# Patient Record
Sex: FEMALE | Race: WHITE
[De-identification: names, ages, dates, MRNs, and addresses within clinical notes are randomized per-mention and may not be internally consistent; named-entity substitution may affect disease eponyms.]

---

## 2017-09-15 ENCOUNTER — HOSPITAL ENCOUNTER (EMERGENCY)
Dept: HOSPITAL 62 - ER | Age: 52
LOS: 1 days | Discharge: HOME | End: 2017-09-16
Payer: MEDICAID

## 2017-09-15 DIAGNOSIS — G89.29: ICD-10-CM

## 2017-09-15 DIAGNOSIS — M54.2: ICD-10-CM

## 2017-09-15 DIAGNOSIS — R51: ICD-10-CM

## 2017-09-15 DIAGNOSIS — E11.9: ICD-10-CM

## 2017-09-15 DIAGNOSIS — J01.90: Primary | ICD-10-CM

## 2017-09-15 DIAGNOSIS — F17.200: ICD-10-CM

## 2017-09-15 DIAGNOSIS — Z79.899: ICD-10-CM

## 2017-09-15 PROCEDURE — 80164 ASSAY DIPROPYLACETIC ACD TOT: CPT

## 2017-09-15 PROCEDURE — 99284 EMERGENCY DEPT VISIT MOD MDM: CPT

## 2017-09-15 PROCEDURE — 36415 COLL VENOUS BLD VENIPUNCTURE: CPT

## 2017-09-15 PROCEDURE — 70450 CT HEAD/BRAIN W/O DYE: CPT

## 2017-09-15 PROCEDURE — 80048 BASIC METABOLIC PNL TOTAL CA: CPT

## 2017-09-15 PROCEDURE — 85025 COMPLETE CBC W/AUTO DIFF WBC: CPT

## 2017-09-16 VITALS — DIASTOLIC BLOOD PRESSURE: 96 MMHG | SYSTOLIC BLOOD PRESSURE: 164 MMHG

## 2017-09-16 LAB
ANION GAP SERPL CALC-SCNC: 13 MMOL/L (ref 5–19)
BASOPHILS # BLD AUTO: 0.1 10^3/UL (ref 0–0.2)
BASOPHILS NFR BLD AUTO: 0.5 % (ref 0–2)
BUN SERPL-MCNC: 6 MG/DL (ref 7–20)
CALCIUM: 9.6 MG/DL (ref 8.4–10.2)
CHLORIDE SERPL-SCNC: 99 MMOL/L (ref 98–107)
CO2 SERPL-SCNC: 23 MMOL/L (ref 22–30)
CREAT SERPL-MCNC: 0.5 MG/DL (ref 0.52–1.25)
EOSINOPHIL # BLD AUTO: 0.1 10^3/UL (ref 0–0.6)
EOSINOPHIL NFR BLD AUTO: 0.6 % (ref 0–6)
ERYTHROCYTE [DISTWIDTH] IN BLOOD BY AUTOMATED COUNT: 13.2 % (ref 11.5–14)
GLUCOSE SERPL-MCNC: 120 MG/DL (ref 75–110)
HCT VFR BLD CALC: 37.5 % (ref 36–47)
HGB BLD-MCNC: 13.1 G/DL (ref 12–15.5)
HGB HCT DIFFERENCE: 1.8
LYMPHOCYTES # BLD AUTO: 4.2 10^3/UL (ref 0.5–4.7)
LYMPHOCYTES NFR BLD AUTO: 39.2 % (ref 13–45)
MCH RBC QN AUTO: 31.2 PG (ref 27–33.4)
MCHC RBC AUTO-ENTMCNC: 35.1 G/DL (ref 32–36)
MCV RBC AUTO: 89 FL (ref 80–97)
MONOCYTES # BLD AUTO: 0.7 10^3/UL (ref 0.1–1.4)
MONOCYTES NFR BLD AUTO: 6.6 % (ref 3–13)
NEUTROPHILS # BLD AUTO: 5.7 10^3/UL (ref 1.7–8.2)
NEUTS SEG NFR BLD AUTO: 53.1 % (ref 42–78)
POTASSIUM SERPL-SCNC: 4.2 MMOL/L (ref 3.6–5)
RBC # BLD AUTO: 4.22 10^6/UL (ref 3.72–5.28)
SODIUM SERPL-SCNC: 134.8 MMOL/L (ref 137–145)
VALPROATE SERPL-MCNC: 50.9 UG/ML (ref 50–120)
WBC # BLD AUTO: 10.8 10^3/UL (ref 4–10.5)

## 2017-09-16 NOTE — RADIOLOGY REPORT (SQ)
EXAM DESCRIPTION:  CT HEAD WITHOUT



COMPLETED DATE/TIME:  9/16/2017 3:13 am



REASON FOR STUDY:  Lt HA



COMPARISON:  None.



TECHNIQUE:  Axial images acquired through the brain without intravenous contrast.  Images reviewed wi
th bone, brain and subdural windows.  Images stored on PACS.

All CT scanners at this facility use dose modulation, iterative reconstruction, and/or weight based d
osing when appropriate to reduce radiation dose to as low as reasonably achievable (ALARA).

[facility]: Dose Right  [facility]: CareDose    [facility]: Dose Right    [facility]: Teradose 4D    [facility]: Smart Technologies



RADIATION DOSE:  Up-to-date CT equipment and radiation dose reduction techniques were employed. CTDIv
ol: 64.6 mGy. DLP: 1163 mGy-cm. mGy.



LIMITATIONS:  None.



FINDINGS:  VENTRICLES: Normal size and contour.

CEREBRUM: No masses.  No hemorrhage.  No midline shift.  No evidence for acute infarction. Normal gra
y/white matter differentiation. No areas of low density in the white matter.

CEREBELLUM: No masses.  No hemorrhage.  No alteration of density.  No evidence for acute infarction.

EXTRAAXIAL SPACES: No fluid collections.  No masses.

ORBITS AND GLOBE: No intra- or extraconal masses.  Normal contour of globe without masses.

CALVARIUM: No fracture.

PARANASAL SINUSES: No fluid or mucosal thickening.

SOFT TISSUES: No mass or hematoma.

OTHER: No other significant finding.



IMPRESSION:  NORMAL BRAIN CT WITHOUT CONTRAST.



COMMENT:  Quality ID # [id number]: Final reports with documentation of one or more dose reduction techniques
 (e.g., Automated exposure control, adjustment of the mA and/or kV according to patient size, use of 
iterative reconstruction technique)



TECHNICAL DOCUMENTATION:  JOB ID:  [id number]

 [employer]- All Rights Reserved

## 2017-09-16 NOTE — ER DOCUMENT REPORT
ED Headache





- General


Chief Complaint: Headache <24 hrs old


Stated Complaint: HEADACHE


Time Seen by Provider: 09/16/17 01:42


Notes: 





51-year-old female history of diabetes and a single seizure presents with left-

sided headache.  She describes it as a pressure behind her left eye now 

radiating some pressure to her left nares and maxillary region.  She has clear 

discharge and feels some congestion.  This is improved with Sudafed mildly as 

well as saline nasal spray.  It is worse with position such as rotating her 

head to the right and feels better when she holds still.  She has had no fever.

  Has been a gradual onset over 2 days but has had slightly blurry vision and 

some horizontal diplopia today.  Denies any vision loss.  No new neck pain but 

has some chronic neck pain and a pinched nerve so she was not too concerned 

about the left-sided tingling that she had tonight.  Denies any focal weakness, 

sensory loss, speech or swallowing difficulty, facial weakness.  No confusion.  

Denies trauma.  No significant history of migraines.  She does report increased 

stress as she has come down from [place] to see her father who is undergoing 

cancer complications.  She is on Depakote for a prior single seizure.


TRAVEL OUTSIDE OF THE [place] IN LAST 30 DAYS: No





- Related Data


Allergies/Adverse Reactions: 


 





No Known Allergies Allergy (Verified 09/16/17 00:02)


 











Past Medical History





- Social History


Smoking Status: Current Every Day Smoker


Family History: Reviewed & Not Pertinent, Other - No aneurysmal issues


Renal/ Medical History: Denies: Hx Peritoneal Dialysis





Review of Systems





- Review of Systems


-: Yes All other systems reviewed and negative





Physical Exam





- Vital signs


Vitals: 


 











Temp Pulse Resp BP Pulse Ox


 


 98.1 F   88   20   164/96 H  97 


 


 09/16/17 00:02  09/16/17 00:02  09/16/17 00:02  09/16/17 00:02  09/16/17 00:02














- Notes


Notes: 





GENERAL: VS as per nursing doc. Well-appearing, well-nourished and in no acute 

distress.





HEAD: Atraumatic, normocephalic.





EYES: Pupils equal round and reactive to light, extraocular movements intact, 

no nystagmus noted.  Sclera anicteric, no conjunctival injection or discharge.





ENT: Nares patent, oropharynx clear without exudates.  Moist mucous membranes.  

Left percussive maxillary tenderness.





NECK: Normal range of motion, supple, no carotid bruits.





LUNGS: Breath sounds slightly coarse but clear to auscultation bilaterally and 

equal.  No wheezes rales or rhonchi.





HEART: Normal S1S2. Regular rate and rhythm without murmurs. Equal peripheral 

pulses.





ABDOMEN: Soft, non-tender.





EXTREMITIES: Normal range of motion. No calf tenderness. No edema.





NEUROLOGICAL: GCS 15, Cranial nerves II-XII intact. Normal visual fields.  

Normal speech without aphasia. No pronator drift. 5/5 RUE strength, 5/5 RLE 

strength, 5/5 LUE strength, 5/5 LLE strength. No cerebellar abnormalities 

including normal finger-nose testing. Negative Babinski, 





PSYCH: Normal mood, normal affect.





SKIN: Warm, Dry, no cyanosis, Cap refill < 2 sec.











Course





- Re-evaluation


Re-evalutation: 





09/16/17 04:57


Headache seems to be improving but not completely gone.  We discussed findings 

and follow-up needs.  No clear sinusitis on the CT but symptomatically she 

seems to have symptoms more with that.  We will treat her acute sinusitis with 

the degree of symptoms she has.  She will use Afrin as well to help with her 

symptoms.  She will try Fioricet for headaches and return if she has worsening 

of the symptomss.  No neurologic abnormalities are noted at the time of recheck.





- Vital Signs


Vital signs: 


 











Temp Pulse Resp BP Pulse Ox


 


 98.1 F   88   20   164/96 H  97 


 


 09/16/17 00:02  09/16/17 00:02  09/16/17 00:02  09/16/17 00:02  09/16/17 00:02














- Laboratory


Result Diagrams: 


 09/16/17 02:35





 09/16/17 02:35


Laboratory results interpreted by me: 


 











  09/16/17 09/16/17





  02:35 02:35


 


WBC  10.8 H 


 


Sodium   134.8 L


 


BUN   6 L


 


Creatinine   0.50 L


 


Glucose   120 H














- Diagnostic Test


Radiology reviewed: Reports reviewed - Nonacute head CT.





Discharge





- Discharge


Clinical Impression: 


 Headache, Acute sinusitis





Condition: Good


Additional Instructions: 


Consider using Afrin nasal spray for up to 3 days to see if this helps with the 

symptoms.  May use the Fioricet as well for discomfort.  Please return for 

worsening or concern.  Follow-up with your primary care when you are back in 

town.


Prescriptions: 


Butalb/Acetaminophen/Caffeine [Fioricet (-40 mg) Tablet] 1 - 2 tab PO 

Q4HP PRN #20 tab


 PRN Reason: 


Azithromycin [Zithromax 250 mg Tablet] 250 mg PO ASDIR PRN #6 tablet


 PRN Reason: